# Patient Record
Sex: MALE | Race: WHITE | Employment: OTHER | URBAN - METROPOLITAN AREA
[De-identification: names, ages, dates, MRNs, and addresses within clinical notes are randomized per-mention and may not be internally consistent; named-entity substitution may affect disease eponyms.]

---

## 2017-09-29 ENCOUNTER — TELEPHONE (OUTPATIENT)
Dept: DERMATOLOGY | Facility: AMBULATORY SURGERY CENTER | Age: 57
End: 2017-09-29

## 2017-09-29 NOTE — TELEPHONE ENCOUNTER
Patient Appointment Date: October 3 @ [address] NY[address] Boyd, 59 y.o., male  Is calling for their Mohs Pre-Op Assessment    does not have Hepatitis C   does not have HIV (If YES, set up consult appointment)  does confirm site (if pathology available)  does ID site. (Can they still visibly see the site)    Brief description of tumor: (symptoms, If prior treatment, duration)    Allergies:  No Known Allergies      does not have a Pacemaker  does not have a Defibrillator    does not need antibiotics  If yes, antibiotic used:; and needs it because  (valve replacement, joint replacement, etc.)  Can patient get antibiotic from primary care provider     is not taking NSAIDs  If yes, is taking , and was asked to d/c 2 days prior to surgery and informed to resume taking 2 days after surgery. Patient can switch to a Tylenol based medication. is not taking aspirin  If yes, is taking because of  (i.e. heart attack, stroke, TIA, bypass surgery, etc.)    is not taking Garlic  is not taking Ginkgo  is not taking Ginseng  is not taking Fish oils  is not taking Vit E    does not take a blood thinner(i.e. Coumadin/Warfarin, Plavix, Pradaxa, Xeralto, Effient)    is not taking Coumadin/Warfarin (If taking needs to have PT/INR drawn and faxed results within a week of surgery)      Pre operative assessment questions asked to patient. Patient has a general understanding of the procedure, and has been versed that there will be local anesthesia used in the procedure and that He will be ok to drive themselves to and from the appointment.

## 2017-10-03 ENCOUNTER — OFFICE VISIT (OUTPATIENT)
Dept: DERMATOLOGY | Facility: AMBULATORY SURGERY CENTER | Age: 57
End: 2017-10-03

## 2017-10-03 VITALS
DIASTOLIC BLOOD PRESSURE: 80 MMHG | OXYGEN SATURATION: 99 % | SYSTOLIC BLOOD PRESSURE: 120 MMHG | BODY MASS INDEX: 26.43 KG/M2 | HEART RATE: 84 BPM | HEIGHT: 72 IN | RESPIRATION RATE: 20 BRPM | TEMPERATURE: 97.8 F | WEIGHT: 195.11 LBS

## 2017-10-03 DIAGNOSIS — C44.319 BASAL CELL CARCINOMA OF BROW: Primary | ICD-10-CM

## 2017-10-03 RX ORDER — CEPHALEXIN 500 MG/1
500 CAPSULE ORAL 2 TIMES DAILY
Qty: 14 CAP | Refills: 0 | Status: SHIPPED | OUTPATIENT
Start: 2017-10-03 | End: 2017-10-10

## 2017-10-03 NOTE — PROGRESS NOTES
This note is written by Cherelle Koenig, as dictated by Michael Mcclain. Kandice Purdy MD.    CC: Basal cell carcinoma on the left lateral brow     History of present illness:     Kimberly Henriquez is a 59 y.o. male referred by Dr. Merlene Bernstein. He has a biopsy-proven infiltrative basal cell carcinoma on the left lateral brow. This is a new basal cell carcinoma present for more than six months described as a lesion noticed by a friend with no prior treatment. Biopsy confirmed the diagnosis of basal cell carcinoma, and I reviewed the written pathology report. He is feeling well and in his usual state of health today. He has no pain, no current illnesses, no other skin concerns. His allergies, medications, medical, and social history are reviewed by me today. He reports that he has had Mohs surgery in the past on his nose by Dr. Vianey Cifuentes and he had an infection after the surgery. Exam:     He is an awake, alert, and oriented 59 y.o. male who appears well and in no distress. There is no preauricular, submandibular, or cervical lymphadenopathy. I examined his face. He has a 16 x 9 mm scar-like plaque on his left lateral brow. He confirms location. Assessment/plan:    1. Basal cell carcinoma, left lateral brow. I discussed the diagnosis of basal cell carcinoma and summarized the pathology report. Mohs surgery is indicated by site, size, and histology. The procedure was discussed, verbal and written consent were obtained. I performed the procedure. Two stages were required to reach a tumor free plane. The surgical defect was managed with complex repair. There were no complications. I prescribed 500 mg Keflex post-operative antibiotic to use as needed at his request; I discussed the side effects of the medication. He will follow up as needed as the site heals. Indications, risks, and options were discussed with Kimberly Henriquez preoperatively.  Risks including, but not limited to: pain, bleeding, infection, tumor recurrence, scarring and damage to motor and/or sensory nerves, were discussed. Kerry Cortez chose Mohs surgery. Kerry Cortez was an acceptable surgery candidate. Kerry Cortez was placed in the appropriate position on the operating table in the Mohs surgery procedure room. The area was prepped and draped in the standard manner. Gentian violet was used to outline the clinical margins of the tumor. Local anesthesia was then obtained. The grossly visible tumor was then removed, an underlying layer was excised and mapped according to the Mohs technique, and the individual specimens examined microscopically. The process was repeated until microscopic examination of the tissue specimens confirmed a tumor-free plane. Hemostasis was obtained with electrosurgery and pressure. The wound was covered between stages with moist saline gauze. The wound management options of second intent healing, layered closure, local flap, and/or full thickness skin graft were discussed. Kerry Cortez understands the aims, risks, alternatives, and possible complications and elects to proceed with a complex layered closure. Wound margins were made vertical, edges undermined in the muscular plane, standing cones corrected at both poles followed by layered closure. The wound was closed with buried 5-0 polysorb suture in the muscle and deep subcutis to reduce width of the wound, a second layer in the dermis to reduce tension on the skin edges, and skin edges were approximated with 6-0 fast gut suture. The final closure length was 38 mm. The wound was bandaged with Vaseline, Telfa, gauze and Coverroll. Wound care instructions (written and verbal) and a follow up appointment were given to Kerry Cortez before discharge. Kerry Cortez was discharged in good condition. 2. History of non melanoma skin cancer. I discussed the diagnosis and recommend routine examinations with Dr. Paul Shelby for surveillance.     The documentation recorded by the scribe accurately reflects the service I personally performed and the decisions made by me. [facility]   OFFICE PROCEDURE PROGRESS NOTE     Chart reviewed for the following:     Violeta Galicia MD, have reviewed the History, Physical and updated the Allergic reactions for [address] performed immediately prior to start of procedure:     Violeta Galicia MD, have performed the following reviews on Kerry Plaster prior to the start of the procedure:     * Patient was identified by name and date of birth   * Agreement on procedure being performed was verified   * Risks and Benefits explained to the patient   * Procedure site verified and marked as necessary   * Patient was positioned for comfort   * Consent was signed and verified     Time: 8:52 AM  Date of procedure: 10/3/2017  Procedure performed by: Noelle Lam.  Joshua Galicia MD   Provider assisted by: LPN   Patient assisted by: self   How tolerated by patient: tolerated the procedure well with no complications   Comments: none

## 2017-10-03 NOTE — MR AVS SNAPSHOT
Visit Information Date & Time Provider Department Dept. Phone Encounter #  
 10/3/2017  8:30 AM [phone] Upcoming Health Maintenance Date Due Hepatitis C Screening 1960 Pneumococcal 19-64 Medium Risk (1 of 1 - PPSV23) 5/23/1977 DTaP/Tdap/Td series (1 - Tdap) 5/23/1979 FOBT Q 1 YEAR AGE 50-75 5/23/2008 INFLUENZA AGE 9 TO ADULT 8/1/2017 Allergies as of 10/3/2017  Review Complete On: 10/3/2017 By: Jose Elias Dawn RN No Known Allergies Current Immunizations  Never Reviewed No immunizations on file. Not reviewed this visit You Were Diagnosed With   
  
 Codes Comments Basal cell carcinoma of brow    -  Primary ICD-10-CM: H63.224 ICD-9-CM: 173.31 Vitals BP Pulse Temp Resp Height(growth percentile) Weight(growth percentile) 120/80 (BP 1 Location: Left arm, BP Patient Position: Sitting) 84 97.8 °F (36.6 °C) (Oral) 20 6' (1.829 m) 195 lb 1.7 oz (88.5 kg) SpO2 BMI Smoking Status 99% 26.46 kg/m2 Current Every Day Smoker BMI and BSA Data Body Mass Index Body Surface Area  
 26.46 kg/m 2 2.12 m 2 Your Updated Medication List  
  
   
This list is accurate as of: 10/3/17  9:02 AM.  Always use your most recent med list. ZULEMA-SELTZER PLUS COLD PO Take 2 Tabs by mouth as needed. aspirin 81 mg chewable tablet Take 81 mg by mouth daily. docusate sodium 100 mg capsule Commonly known as:  Lucetta Abbott Take 1 Cap by mouth two (2) times a day. Patient Instructions WOUND CARE INSTRUCTIONS 1. Keep the dressing clean and dry and do not remove for 48 hours. 2. Then change the dressing once a day as follows: 
a. Wash hands before and after each dressing change. b. Remove dressing and wash site gently with mild soap and water, rinse, and pat dry. c. Apply an ointment (Bacitracin, Polysporin, Neosporin, Petroleum jelly or Aquaphor). d. Apply a non-stick (Telfa) dressing or Band-Aid to cover the wound. Remove pressure bandage on Thursday, then wash gently and apply Vaseline and a band-aid to site daily for 1 week. 3. Watch for: BLEEDING: A small amount of drainage may occur. If bleeding occurs, elevate and rest the surgery site. Apply gauze and steady pressure for 15 minutes. If bleeding continues, call this office. INFECTION: Signs of infection include increased redness, pain, warmth, drainage of pus, and fever. If this occurs, call this office. 4. Special Instructions (follow any that are checked): ·  You have stitches that DO NOT need to be removed. ·  Avoid bending at the waist and heavy lifting for two days. ·  Sleep with your head elevated for the next two nights. ·  Rest the surgery site and keep it elevated as much as possible for two days. ·  You may apply an ice-pack for 10-15 minutes every waking hour for the rest of the day. ·  Eat a soft diet and avoid hot food and hot drinks for the rest of the day. ·  Other instructions: Follow up as directed. Take Tylenol for pain as needed. Once the site is healed with no remaining bandages or open areas, protect your surgical site and scar from the sun, as this area will be more sensitive. Use a broad spectrum sunscreen SPF 30 or higher daily, and a chemical free product (one containing zinc oxide or titanium dioxide) is a good choice if the area is sensitive. You may begin to gently massage the surgical site in 2-3 weeks, rubbing in a circular motion along the scar. This can help reduce swelling and thickness of a scar. A scar cream may be used beginnning 1 month after the surgery. If you have any questions or concerns, please call our office Monday through Friday at [phone]. Introducing [facility] SERVICES! Lavonne gracia [employer] patient portal. Now you can access parts of your medical record, email your doctor's office, and request medication refills online. 1. In your internet browser, go to [email or web address]. [email or web address] 2. Click on the First Time User? Click Here link in the Sign In box. You will see the New Member Sign Up page. 3. Enter your [employer] Access Code exactly as it appears below. You will not need to use this code after youve completed the sign-up process. If you do not sign up before the expiration date, you must request a new code. · [employer] Access Code: [email or web address] Expires: 1/1/2018  9:01 AM 
 
4. Enter the last four digits of your Social Security Number (xxxx) and Date of Birth (mm/dd/yyyy) as indicated and click Submit. You will be taken to the next sign-up page. 5. Create a [employer] ID. This will be your [employer] login ID and cannot be changed, so think of one that is secure and easy to remember. 6. Create a [employer] password. You can change your password at any time. 7. Enter your Password Reset Question and Answer. This can be used at a later time if you forget your password. 8. Enter your e-mail address. You will receive e-mail notification when new information is available in [address] 9. Click Sign Up. You can now view and download portions of your medical record. 10. Click the Download Summary menu link to download a portable copy of your medical information. If you have questions, please visit the Frequently Asked Questions section of the [employer] website. Remember, [employer] is NOT to be used for urgent needs. For medical emergencies, dial 911. Now available from your iPhone and Android! Please provide this summary of care documentation to your next provider. Your primary care clinician is listed as [address]. If you have any questions after today's visit, please call [phone].

## 2017-10-03 NOTE — PATIENT INSTRUCTIONS
WOUND CARE INSTRUCTIONS    1. Keep the dressing clean and dry and do not remove for 48 hours. 2. Then change the dressing once a day as follows:  a. Wash hands before and after each dressing change. b. Remove dressing and wash site gently with mild soap and water, rinse, and pat dry.  c. Apply an ointment (Bacitracin, Polysporin, Neosporin, Petroleum jelly or Aquaphor). d. Apply a non-stick (Telfa) dressing or Band-Aid to cover the wound. Remove pressure bandage on Thursday, then wash gently and apply Vaseline and a band-aid to site daily for 1 week. 3. Watch for:  BLEEDING: A small amount of drainage may occur. If bleeding occurs, elevate and rest the surgery site. Apply gauze and steady pressure for 15 minutes. If bleeding continues, call this office. INFECTION: Signs of infection include increased redness, pain, warmth, drainage of pus, and fever. If this occurs, call this office. 4. Special Instructions (follow any that are checked):  · [x] You have stitches that DO NOT need to be removed. · [x] Avoid bending at the waist and heavy lifting for two days. · [x] Sleep with your head elevated for the next two nights. · [x] Rest the surgery site and keep it elevated as much as possible for two days. · [x] You may apply an ice-pack for 10-15 minutes every waking hour for the rest of the day. · [] Eat a soft diet and avoid hot food and hot drinks for the rest of the day. · [] Other instructions: Follow up as directed. Take Tylenol for pain as needed. Once the site is healed with no remaining bandages or open areas, protect your surgical site and scar from the sun, as this area will be more sensitive. Use a broad spectrum sunscreen SPF 30 or higher daily, and a chemical free product (one containing zinc oxide or titanium dioxide) is a good choice if the area is sensitive. You may begin to gently massage the surgical site in 2-3 weeks, rubbing in a circular motion along the scar. This can help reduce swelling and thickness of a scar. A scar cream may be used beginnning 1 month after the surgery. If you have any questions or concerns, please call our office Monday through Friday at [phone].

## 2017-10-03 NOTE — PROGRESS NOTES
Pre-op: Patient presents today for the evaluation of BCC to the Left lateral eyebrow. Procedure explained with full understanding. Vitals:    10/03/17 0825   BP: 120/80   Pulse: 84   Resp: 20   Temp: 97.8 °F (36.6 °C)   TempSrc: Oral   SpO2: 99%   Weight: 88.5 kg (195 lb 1.7 oz)   Height: 6' (1.829 m)     preoperatively, will continue to monitor. Post-op: Written and verbal post-op wound care instructions given to patient with full understanding of care. Surgical wound bandaged with Vaseline, Telfa, 2x2 gauze, and coverall tape. All questions and concerns addressed. Vitals stable postoperatively.

## 2017-10-06 ENCOUNTER — TELEPHONE (OUTPATIENT)
Dept: DERMATOLOGY | Facility: AMBULATORY SURGERY CENTER | Age: 57
End: 2017-10-06

## 2017-10-06 NOTE — TELEPHONE ENCOUNTER
Called patient regarding MOHs post op. Patient states he is healing well and denies any pain, bleeding, or swelling at the site. Pt denies any questions or concerns at this time.

## 2017-10-27 ENCOUNTER — OFFICE VISIT (OUTPATIENT)
Dept: DERMATOLOGY | Facility: AMBULATORY SURGERY CENTER | Age: 57
End: 2017-10-27

## 2017-10-27 VITALS
TEMPERATURE: 97.6 F | HEART RATE: 76 BPM | SYSTOLIC BLOOD PRESSURE: 122 MMHG | BODY MASS INDEX: 26.43 KG/M2 | WEIGHT: 195.11 LBS | OXYGEN SATURATION: 96 % | DIASTOLIC BLOOD PRESSURE: 84 MMHG | HEIGHT: 72 IN | RESPIRATION RATE: 14 BRPM

## 2017-10-27 DIAGNOSIS — T81.89XA SUTURE GRANULOMA, INITIAL ENCOUNTER: ICD-10-CM

## 2017-10-27 DIAGNOSIS — Z98.890 HISTORY OF MOH'S MICROGRAPHIC SURGERY FOR SKIN CANCER: ICD-10-CM

## 2017-10-27 DIAGNOSIS — Z48.3 AFTERCARE FOLLOWING SURGERY FOR NEOPLASM: Primary | ICD-10-CM

## 2017-10-27 DIAGNOSIS — Z85.828 HISTORY OF MOH'S MICROGRAPHIC SURGERY FOR SKIN CANCER: ICD-10-CM

## 2017-10-27 RX ORDER — CEPHALEXIN 500 MG/1
CAPSULE ORAL
Qty: 28 CAP | Refills: 0 | Status: SHIPPED | OUTPATIENT
Start: 2017-10-27

## 2017-10-27 NOTE — PROGRESS NOTES
Name: Gucci Batres       Age: 59 y.o. Date: 10/27/2017    Chief Complaint: had concerns including Wound Check. Subjective:    HPI  Mr. Ny Gross is a 59 y.o. male who presents for a follow up from mohs surgery on the left eyebrow on 10/3. The patient has not had problems such as pain or bleeding since the last visit. The patient does have any current concerns. In the last few days, he has noted swelling and redness. ROS: Constitutional: Negative  Dermatological : positive for - skin lesion changes      Social History     Social History    Marital status:      Spouse name: N/A    Number of children: N/A    Years of education: N/A     Occupational History    Not on file. Social History Main Topics    Smoking status: Current Every Day Smoker     Packs/day: 0.50     Years: 25.00    Smokeless tobacco: Never Used    Alcohol use Yes      Comment: rarely    Drug use: No    Sexual activity: Not on file     Other Topics Concern    Not on file     Social History Narrative       No family history on file. Past Medical History:   Diagnosis Date    Degenerative disc disease     Family history of skin cancer     Skin cancer     Smoker     Sun-damaged skin     Tanning bed exposure     25 years ago       Past Surgical History:   Procedure Laterality Date    HX HERNIA REPAIR  1/3/2013    Right (Dr Kecia Florence)    HX MOHS PROCEDURES  10/03/2017    BCC L lateral brow by Dr. Rosanna Simental      bilateral elbow surgery    HX ORTHOPAEDIC      right forearm surgery       Current Outpatient Prescriptions   Medication Sig Dispense Refill    cephALEXin (KEFLEX) 500 mg capsule Two pills twice daily for 7 days 28 Cap 0    P-EPHED HCL/ACETAMINOPHEN/CP (ZULEMA-SELTZER PLUS COLD PO) Take 2 Tabs by mouth as needed.  docusate sodium (COLACE) 100 mg capsule Take 1 Cap by mouth two (2) times a day. 30 Cap 0    aspirin 81 mg chewable tablet Take 81 mg by mouth daily.            No Known Allergies      Objective:    Visit Vitals    /84    Pulse 76    Temp 97.6 °F (36.4 °C)    Resp 14    Ht 6' (1.829 m)    Wt 88.5 kg (195 lb 1.7 oz)    SpO2 96%    BMI 26.46 kg/m2       Henny Corcoran is a 59 y.o. male who appears well and in no distress. He is awake, alert, and oriented. A limited examination of the left eyebrow was completed. There is redness and swelling of the lateral aspect of the eyebrow areas. There is a retained suture at the medial aspect of the scar and a pustule at the lateral aspect. Assessment/Plan:  1. Follow up from mohs surgery on the left eyebrow for BCC. The suture was removed. The pustule was opened which drained approximately 0.5 cc of pus and blood. Swelling was reduced post drainage. I instructed him on warm compresses and I prescribed Keflex for 7 days. He states something similar happened to his nose when he has surgery before and he wonders if the deep sutures have something to do with it. Definitely a possible reaction. He was counseled to call on call this weekend if worse or if redness of eyelid increases or spreads onto forehead - go to the ER. He expressed understanding.

## 2018-03-20 ENCOUNTER — HOSPITAL ENCOUNTER (OUTPATIENT)
Dept: ULTRASOUND IMAGING | Age: 58
Discharge: HOME | End: 2018-03-20
Attending: SURGERY
Payer: MEDICARE

## 2018-03-20 ENCOUNTER — OFFICE VISIT (OUTPATIENT)
Dept: SURGERY | Age: 58
End: 2018-03-20

## 2018-03-20 VITALS
TEMPERATURE: 98.3 F | WEIGHT: 220.4 LBS | BODY MASS INDEX: 29.85 KG/M2 | HEIGHT: 72 IN | DIASTOLIC BLOOD PRESSURE: 89 MMHG | SYSTOLIC BLOOD PRESSURE: 125 MMHG | OXYGEN SATURATION: 96 % | RESPIRATION RATE: 16 BRPM | HEART RATE: 83 BPM

## 2018-03-20 DIAGNOSIS — R10.31 RIGHT INGUINAL PAIN: Primary | ICD-10-CM

## 2018-03-20 DIAGNOSIS — R10.31 RIGHT INGUINAL PAIN: ICD-10-CM

## 2018-03-20 PROCEDURE — 76882 US LMTD JT/FCL EVL NVASC XTR: CPT

## 2018-03-20 RX ORDER — ALPRAZOLAM 1 MG/1
TABLET ORAL
COMMUNITY
Start: 2018-03-19

## 2018-03-20 NOTE — PROGRESS NOTES
Chief Complaint   Patient presents with    Inguinal Hernia     repaired 6 years ago,  pain started 2 weeks ago       1. Have you been to the ER, urgent care clinic since your last visit? Hospitalized since your last visit? no    2. Have you seen or consulted any other health care providers outside of the [address] [place] [address] since your last visit? Include any pap smears or colon screening.   no

## 2018-03-20 NOTE — MR AVS SNAPSHOT
[address], [address] [address] 
[phone] Patient: Belem Gtz MRN: [id number] YQZ:5/35/1825 Visit Information Date & Time Provider Department Dept. Phone Encounter #  
 3/20/2018 10:20 AM Sherrie Bowie MD [facility] 58 [id number] Upcoming Health Maintenance Date Due Hepatitis C Screening 1960 Pneumococcal 19-64 Medium Risk (1 of 1 - PPSV23) 5/23/1977 DTaP/Tdap/Td series (1 - Tdap) 5/23/1979 FOBT Q 1 YEAR AGE 50-75 5/23/2008 Influenza Age 5 to Adult 8/1/2017 Allergies as of 3/20/2018  Review Complete On: 3/20/2018 By: Anupama Younger LPN No Known Allergies Current Immunizations  Never Reviewed No immunizations on file. Not reviewed this visit You Were Diagnosed With   
  
 Codes Comments Right inguinal pain    -  Primary ICD-10-CM: R10.31 ICD-9-CM: 789.09 Vitals BP Pulse Temp Resp Height(growth percentile) Weight(growth percentile) 125/89 (BP 1 Location: Left arm, BP Patient Position: Sitting) 83 98.3 °F (36.8 °C) (Oral) 16 6' (1.829 m) 220 lb 6.4 oz (100 kg) SpO2 BMI Smoking Status 96% 29.89 kg/m2 Current Every Day Smoker Vitals History BMI and BSA Data Body Mass Index Body Surface Area  
 29.89 kg/m 2 2.25 m 2 Your Updated Medication List  
  
   
This list is accurate as of 3/20/18 10:59 AM.  Always use your most recent med list. ZULEMA-SELTZER PLUS COLD PO Take 2 Tabs by mouth as needed. ALPRAZolam 1 mg tablet Commonly known as:  XANAX  
  
 aspirin 81 mg chewable tablet Take 81 mg by mouth daily. cephALEXin 500 mg capsule Commonly known as:  Janet Grief Two pills twice daily for 7 days  
  
 docusate sodium 100 mg capsule Commonly known as:  Mozelle Clause Take 1 Cap by mouth two (2) times a day. FLUCELVAX QUAD 9694-4270 (PF) Syrg injection Generic drug:  influenza vaccine 2017-18 (4 yrs+)(PF)  
ADM 0.5ML IM UTD To-Do List   
 03/20/2018 Imaging:  US EXT NONVAS RT COMP   
  
 03/20/2018 1:00 PM  
  Appointment with Daysi Holloway at [facility] Ultrasound ([phone]) No Prep  GENERAL INSTRUCTIONS 1. Bring any non [facility] facility films/reports pertaining to the area being studied with you on the day of appointment. 2. A written order with a valid diagnosis and Physicians signature is required for all scheduled tests. 3. Check in at registration 30 minutes before your appointment time unless you were instructed to do otherwise. Introducing [facility] & HEALTH SERVICES! [facility] introduces [employer] patient portal. Now you can access parts of your medical record, email your doctor's office, and request medication refills online. 1. In your internet browser, go to [email or web address] 2. Click on the First Time User? Click Here link in the Sign In box. You will see the New Member Sign Up page. 3. Enter your [employer] Access Code exactly as it appears below. You will not need to use this code after youve completed the sign-up process. If you do not sign up before the expiration date, you must request a new code. · [employer] Access Code: [email or web address] Expires: 6/18/2018 10:29 AM 
 
4. Enter the last four digits of your Social Security Number (xxxx) and Date of Birth (mm/dd/yyyy) as indicated and click Submit. You will be taken to the next sign-up page. 5. Create a [employer] ID. This will be your [employer] login ID and cannot be changed, so think of one that is secure and easy to remember. 6. Create a [employer] password. You can change your password at any time. 7. Enter your Password Reset Question and Answer. This can be used at a later time if you forget your password. 8. Enter your e-mail address.  You will receive e-mail notification when new information is available in [employer]. 9. Click Sign Up. You can now view and download portions of your medical record. 10. Click the Download Summary menu link to download a portable copy of your medical information. If you have questions, please visit the Frequently Asked Questions section of the [employer] website. Remember, [employer] is NOT to be used for urgent needs. For medical emergencies, dial 911. Now available from your iPhone and Android! Please provide this summary of care documentation to your next provider. Your primary care clinician is listed as Ema Alarcon. If you have any questions after today's visit, please call [phone].

## 2018-03-20 NOTE — PROGRESS NOTES
Surgery Consult:  Right inguinodynia  Requesting physician:  JYOTI Calderon    Subjective:   Patient 59 y.o.  male s/p Lap RIHR on 1/3/13 presents with couple week history of right groin bulge and intermittent discomfort especially with coughing. Denies any recent trauma or heavy lifting. No obstructive symptoms. No nausea or vomiting. Patient with mild constipation. No dysuria or F/C/S. No other previous abdominal surgeries. Denies any bulge or pain in his left groin. Past Medical & Surgical History:  Past Medical History:   Diagnosis Date    Degenerative disc disease     Family history of skin cancer     Skin cancer     Smoker     Sun-damaged skin     Tanning bed exposure     25 years ago      Past Surgical History:   Procedure Laterality Date    HX HERNIA REPAIR  1/3/2013    Right (Dr Jimbo Myles)    HX MOHS PROCEDURES  10/03/2017    BCC L lateral brow by Dr. Edmond Cooks      bilateral elbow surgery    HX ORTHOPAEDIC      right forearm surgery       Social History:  Social History     Social History    Marital status:      Spouse name: N/A    Number of children: N/A    Years of education: N/A     Occupational History    Not on file. Social History Main Topics    Smoking status: Current Every Day Smoker     Packs/day: 0.50     Years: 25.00    Smokeless tobacco: Never Used    Alcohol use Yes      Comment: rarely    Drug use: No    Sexual activity: Not on file     Other Topics Concern    Not on file     Social History Narrative        Family History:  Family History   Problem Relation Age of Onset    No Known Problems Mother     No Known Problems Father         Medications:  Current Outpatient Prescriptions   Medication Sig    FLUCELVAX QUAD 5282-5257, PF, syrg injection ADM 0.5ML IM UTD    ALPRAZolam (XANAX) 1 mg tablet     P-EPHED HCL/ACETAMINOPHEN/CP (ZULEMA-SELTZER PLUS COLD PO) Take 2 Tabs by mouth as needed.       aspirin 81 mg chewable tablet Take 81 mg by mouth daily.  cephALEXin (KEFLEX) 500 mg capsule Two pills twice daily for 7 days    docusate sodium (COLACE) 100 mg capsule Take 1 Cap by mouth two (2) times a day. No current facility-administered medications for this visit. Allergies:  No Known Allergies    Review of Systems  A comprehensive review of systems was negative except for that written in the HPI. Objective:     Exam:    Visit Vitals    /89 (BP 1 Location: Left arm, BP Patient Position: Sitting)    Pulse 83    Temp 98.3 °F (36.8 °C) (Oral)    Resp 16    Ht 6' (1.829 m)    Wt 100 kg (220 lb 6.4 oz)    SpO2 96%    BMI 29.89 kg/m2     General appearance: alert, cooperative, no distress, appears stated age  Eyes: negative  Lungs: clear to auscultation bilaterally  Heart: regular rate and rhythm  Abdomen: soft, non-tender. Non-distended. No obvious hernia bilateral groin. Male genitalia: normal  Extremities: extremities normal, atraumatic, no cyanosis or edema. HERIBERTO.   Skin: Skin color, texture, turgor normal. No rashes or lesions  Neurologic: Grossly normal        Assessment:     Right inguinodynia    Plan:     Stress right groin US to r/o recurrent hernia

## 2018-03-21 ENCOUNTER — TELEPHONE (OUTPATIENT)
Dept: SURGERY | Age: 58
End: 2018-03-21

## 2018-03-21 NOTE — TELEPHONE ENCOUNTER
Results of the US discussed. No recurrent hernia. No surgery needed at this time. Instructed to take NSAIDs prn.